# Patient Record
Sex: FEMALE | Race: WHITE | NOT HISPANIC OR LATINO
[De-identification: names, ages, dates, MRNs, and addresses within clinical notes are randomized per-mention and may not be internally consistent; named-entity substitution may affect disease eponyms.]

---

## 2019-02-26 ENCOUNTER — RECORD ABSTRACTING (OUTPATIENT)
Age: 49
End: 2019-02-26

## 2019-02-26 DIAGNOSIS — Z86.39 PERSONAL HISTORY OF OTHER ENDOCRINE, NUTRITIONAL AND METABOLIC DISEASE: ICD-10-CM

## 2019-02-26 DIAGNOSIS — J06.9 ACUTE UPPER RESPIRATORY INFECTION, UNSPECIFIED: ICD-10-CM

## 2019-02-26 DIAGNOSIS — H91.92 UNSPECIFIED HEARING LOSS, LEFT EAR: ICD-10-CM

## 2019-02-26 DIAGNOSIS — Z91.09 OTHER ALLERGY STATUS, OTHER THAN TO DRUGS AND BIOLOGICAL SUBSTANCES: ICD-10-CM

## 2019-02-26 DIAGNOSIS — Z83.3 FAMILY HISTORY OF DIABETES MELLITUS: ICD-10-CM

## 2019-02-26 DIAGNOSIS — Z87.19 PERSONAL HISTORY OF OTHER DISEASES OF THE DIGESTIVE SYSTEM: ICD-10-CM

## 2019-02-26 DIAGNOSIS — Z86.59 PERSONAL HISTORY OF OTHER MENTAL AND BEHAVIORAL DISORDERS: ICD-10-CM

## 2019-02-26 DIAGNOSIS — Z80.9 FAMILY HISTORY OF MALIGNANT NEOPLASM, UNSPECIFIED: ICD-10-CM

## 2019-02-26 DIAGNOSIS — Z82.49 FAMILY HISTORY OF ISCHEMIC HEART DISEASE AND OTHER DISEASES OF THE CIRCULATORY SYSTEM: ICD-10-CM

## 2019-02-26 PROBLEM — Z00.00 ENCOUNTER FOR PREVENTIVE HEALTH EXAMINATION: Status: ACTIVE | Noted: 2019-02-26

## 2019-02-26 LAB — CYTOLOGY CVX/VAG DOC THIN PREP: NORMAL

## 2019-02-26 RX ORDER — CABERGOLINE 0.5 MG/1
TABLET ORAL
Refills: 0 | Status: ACTIVE | COMMUNITY

## 2019-02-26 RX ORDER — BUPROPION HYDROCHLORIDE 300 MG/1
300 TABLET, EXTENDED RELEASE ORAL
Refills: 0 | Status: ACTIVE | COMMUNITY

## 2019-02-26 RX ORDER — PRAMIPEXOLE DIHYDROCHLORIDE 0.75 MG/1
TABLET ORAL
Refills: 0 | Status: ACTIVE | COMMUNITY

## 2019-02-26 RX ORDER — LIOTHYRONINE SODIUM 5 UG/1
TABLET ORAL
Refills: 0 | Status: ACTIVE | COMMUNITY

## 2019-02-26 RX ORDER — CHOLECALCIFEROL (VITAMIN D3)
CRYSTALS MISCELLANEOUS
Refills: 0 | Status: ACTIVE | COMMUNITY

## 2019-02-26 RX ORDER — LEVOMEFOLATE CALCIUM 15 MG
15 TABLET ORAL
Refills: 0 | Status: ACTIVE | COMMUNITY

## 2019-02-26 RX ORDER — FEXOFENADINE HYDROCHLORIDE 180 MG/1
180 TABLET ORAL DAILY
Refills: 0 | Status: ACTIVE | COMMUNITY

## 2019-02-26 RX ORDER — RANITIDINE HYDROCHLORIDE 300 MG/1
300 CAPSULE ORAL
Refills: 0 | Status: ACTIVE | COMMUNITY

## 2019-02-26 RX ORDER — ARMODAFINIL 150 MG/1
150 TABLET ORAL
Refills: 0 | Status: ACTIVE | COMMUNITY

## 2019-02-26 RX ORDER — MULTIVIT-MIN/FOLIC/VIT K/LYCOP 400-300MCG
50 MCG TABLET ORAL
Refills: 0 | Status: ACTIVE | COMMUNITY

## 2019-02-26 RX ORDER — ROSUVASTATIN CALCIUM 10 MG/1
10 TABLET, FILM COATED ORAL
Refills: 0 | Status: ACTIVE | COMMUNITY

## 2019-02-26 RX ORDER — METFORMIN HYDROCHLORIDE 500 MG/1
500 TABLET, EXTENDED RELEASE ORAL
Refills: 0 | Status: ACTIVE | COMMUNITY

## 2019-02-26 RX ORDER — FOLIC ACID/VIT B COMPLEX AND C 400 MCG
TABLET ORAL DAILY
Refills: 0 | Status: ACTIVE | COMMUNITY

## 2019-02-26 RX ORDER — ESCITALOPRAM OXALATE 20 MG/1
20 TABLET ORAL DAILY
Refills: 0 | Status: ACTIVE | COMMUNITY

## 2019-03-04 ENCOUNTER — APPOINTMENT (OUTPATIENT)
Dept: OTOLARYNGOLOGY | Facility: CLINIC | Age: 49
End: 2019-03-04
Payer: COMMERCIAL

## 2019-03-04 VITALS
WEIGHT: 153 LBS | SYSTOLIC BLOOD PRESSURE: 100 MMHG | DIASTOLIC BLOOD PRESSURE: 63 MMHG | HEART RATE: 98 BPM | BODY MASS INDEX: 25.49 KG/M2 | HEIGHT: 65 IN

## 2019-03-04 PROCEDURE — 99213 OFFICE O/P EST LOW 20 MIN: CPT

## 2019-03-04 RX ORDER — METHYLPREDNISOLONE 4 MG/1
4 TABLET ORAL
Refills: 0 | Status: DISCONTINUED | COMMUNITY
End: 2019-03-04

## 2019-03-04 NOTE — HISTORY OF PRESENT ILLNESS
[de-identified] : ALIYAH CLINTON Was seen in followup on March 4. Her sinus culture had grown staph aureus and her throat was hemolytic strep, both sensitive.  She had no problems on the flight and basically is feeling better. She is not having any more purulence in her throat is improved but she still is quite congested.\par Additionally, she has a history of hearing losses and is doing well with her hearing aids.The patient had no other ear nose or throat complaints at this visit.

## 2019-03-04 NOTE — ASSESSMENT
[FreeTextEntry1] : It was my impression that she has a sensorineural hearing losses and I recommended following up to have her hearing aids evaluated as needed.\par She is much better but still has purulence in the left middle meatus. Her previous culture was staph and strep and I suggested another 10 day course of Augmentin. However, because of the recurrences in the persistence and her septal deflection, I suggested imaging of the paranasal sinuses post treatment and would like to see her back in followup

## 2019-03-04 NOTE — PHYSICAL EXAM
[FreeTextEntry1] : General:\par The patient was alert and oriented and in no distress.\par Voice was clear. She looked better but sounded congested.\par \par Oral cavity:\par The oral mucosa was normal.\par The oral and base of tongue were clear and without mass.\par The gingival and buccal mucosa were moist and without lesions.\par The palate moved well.\par There was no cleft to the palate.\par There appeared to be good salivary flow.  \par There was no pus, erythema or mass in the oral cavity.\par \par Neck: \par The neck was symmetrical.\par The parotid and submandibular glands were normal without masses.\par The trachea was midline and there was no unusual crepitus.\par The thyroid was smooth and nontender and no masses were palpated.\par There was no significant cervical adenopathy.\par \par Face:\par The patient had no facial asymmetry or mass.\par The skin was unremarkable.\par \par Ears:\par The external ears were normal without deformity.\par The ear canals were clear.\par The tympanic membranes were intact and normal.\par She uses binaural amplification.\par \par Nasal endoscopy: \par CPT 45488\par Procedure Note:\par \par Nasal endoscopy was done with topical anesthesia of Pontocaine and Afrin and a      nasal endoscope.\par Indication: Nasal congestion, rule out sinusitis. Evaluation of response to therapy\par Procedure: The nasal cavity was anesthetized with topical Afrin and Pontocaine. An  endoscope was used and inserted into the nasal cavity.\par Attention was first paid to the anterior nasal cavity.\par This showed that the nasal mucosa was much improved. However, there still was scarring and a significant left septal deflection and purulent discharge in the left middle meatus. I did not reculture.\par \par \par \par

## 2019-03-22 ENCOUNTER — APPOINTMENT (OUTPATIENT)
Dept: OTOLARYNGOLOGY | Facility: CLINIC | Age: 49
End: 2019-03-22
Payer: COMMERCIAL

## 2019-03-22 PROCEDURE — V5299A: CUSTOM | Mod: NC

## 2019-03-31 ENCOUNTER — TRANSCRIPTION ENCOUNTER (OUTPATIENT)
Age: 49
End: 2019-03-31

## 2019-04-03 ENCOUNTER — APPOINTMENT (OUTPATIENT)
Dept: OTOLARYNGOLOGY | Facility: CLINIC | Age: 49
End: 2019-04-03

## 2019-04-18 ENCOUNTER — APPOINTMENT (OUTPATIENT)
Dept: OTOLARYNGOLOGY | Facility: CLINIC | Age: 49
End: 2019-04-18
Payer: COMMERCIAL

## 2019-04-18 PROCEDURE — V5299A: CUSTOM | Mod: NC

## 2019-04-25 ENCOUNTER — APPOINTMENT (OUTPATIENT)
Dept: OTOLARYNGOLOGY | Facility: CLINIC | Age: 49
End: 2019-04-25
Payer: COMMERCIAL

## 2019-04-25 VITALS
WEIGHT: 153 LBS | SYSTOLIC BLOOD PRESSURE: 117 MMHG | BODY MASS INDEX: 25.49 KG/M2 | HEIGHT: 65 IN | HEART RATE: 89 BPM | DIASTOLIC BLOOD PRESSURE: 75 MMHG

## 2019-04-25 DIAGNOSIS — Z97.4 PRESENCE OF EXTERNAL HEARING-AID: ICD-10-CM

## 2019-04-25 DIAGNOSIS — R09.81 NASAL CONGESTION: ICD-10-CM

## 2019-04-25 DIAGNOSIS — J34.2 DEVIATED NASAL SEPTUM: ICD-10-CM

## 2019-04-25 PROCEDURE — 99213 OFFICE O/P EST LOW 20 MIN: CPT | Mod: 25

## 2019-04-25 PROCEDURE — 31231 NASAL ENDOSCOPY DX: CPT

## 2019-04-25 RX ORDER — AMOXICILLIN AND CLAVULANATE POTASSIUM 875; 125 MG/1; MG/1
875-125 TABLET, COATED ORAL
Qty: 20 | Refills: 1 | Status: DISCONTINUED | COMMUNITY
Start: 2019-03-04 | End: 2019-04-25

## 2019-04-25 NOTE — ASSESSMENT
[FreeTextEntry1] : I reviewed the findings with her. 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 She has scattered frontal ethmoidal disease.\par \par As she has failed maximal medical therapy,\par I recommended revision septoplasty and inferior to the reduction this with bilateral maxillary and ethmoid surgery with opening of the accessory and frontal sinusotomies.\par \par Risks and benefits were discussed.

## 2019-04-25 NOTE — PHYSICAL EXAM
[FreeTextEntry1] : General:\par The patient was alert and oriented and in no distress.\par Voice was clear. She was somewhat congested but improved\par \par Ears:\par The external ears were normal without deformity.\par The ear canals were clear.\par The tympanic membranes were intact and normal.\par She uses hearing aids binaurally\par \par Oral cavity:\par The oral mucosa was normal.\par The oral and base of tongue were clear and without mass.\par The gingival and buccal mucosa were moist and without lesions.\par The palate moved well.\par There was no cleft to the palate.\par There appeared to be good salivary flow.  \par There was no pus, erythema or mass in the oral cavity.\par \par Neck: \par The neck was symmetrical.\par The parotid and submandibular glands were normal without masses.\par The trachea was midline and there was no unusual crepitus.\par The thyroid was smooth and nontender and no masses were palpated.\par There was no significant cervical adenopathy.\par \par Nasal endoscopy: \par CPT 24981\par Procedure Note:\par \par Nasal endoscopy was done with topical anesthesia of Pontocaine and Afrin and a      nasal endoscope.\par Indication: Nasal congestion, rule out sinusitis.\par Procedure: The nasal cavity was anesthetized with topical Afrin and Pontocaine. An  endoscope was used and inserted into the nasal cavity.\par Attention was first paid to the anterior nasal cavity.\par This was done with her CT for guidance. This shows that the nasal mucosa is quite boggy. There was a persistent the right septal deflection. There was stenosis and scarring of the left nasal airway along the anterior tip of the middle turbinate and extending to the lateral wall and obstructing the osteomeatal unit. There was a small amount of thickened discharge.\par The right middle meatus was also obstructed by scarring.\par \par A review the imaging with her.- done after maximal improvement from prolonged medical care\par Imaging shows inferior turbinate hypertrophy and scarring of the turbinates to the septum with residual right septal deflection.\par It shows bilateral missed ostium sequences with obstruction to the natural ostium from scarring and moderate ethmoidal disease and thickening in the frontal recesses.\par \par

## 2019-04-25 NOTE — HISTORY OF PRESENT ILLNESS
[de-identified] : ALIYAH CLINTON Was seen in followup on April 24th.  She had finished another course of antibiotics and was improved- but not feeling completely better.  She had CT scans post maximal therapy . Her sinus culture had grown staph aureus and her throat was hemolytic strep, both sensitive.  She still has congestion and right forehead pain with scattered purulent discharge.  She has not had recent dental problems. \par Additionally, she has a history of hearing losses and is doing well with her hearing aids.The patient had no other ear nose or throat complaints at this visit.

## 2019-04-25 NOTE — CONSULT LETTER
[Dear  ___] : Dear  [unfilled], [Please see my note below.] : Please see my note below. [Consult Letter:] : I had the pleasure of evaluating your patient, [unfilled]. [Consult Closing:] : Thank you very much for allowing me to participate in the care of this patient.  If you have any questions, please do not hesitate to contact me. [Sincerely,] : Sincerely, [FreeTextEntry2] : ATHANASIO MALLIOS\par  [FreeTextEntry3] : Petr Tierney\par \par Slim Tierney MD\par [facility]\par [facility]\par  [facility]\par \par

## 2019-06-26 RX ORDER — OMEPRAZOLE 40 MG/1
40 CAPSULE, DELAYED RELEASE ORAL
Qty: 30 | Refills: 5 | Status: ACTIVE | COMMUNITY
Start: 2019-06-26 | End: 1900-01-01

## 2019-06-26 RX ORDER — PREDNISONE 20 MG/1
20 TABLET ORAL DAILY
Qty: 8 | Refills: 0 | Status: ACTIVE | COMMUNITY
Start: 2019-06-26 | End: 1900-01-01

## 2019-06-27 ENCOUNTER — RESULT REVIEW (OUTPATIENT)
Age: 49
End: 2019-06-27

## 2019-06-27 ENCOUNTER — OUTPATIENT (OUTPATIENT)
Dept: OUTPATIENT SERVICES | Facility: HOSPITAL | Age: 49
LOS: 1 days | Discharge: HOME | End: 2019-06-27
Payer: COMMERCIAL

## 2019-06-27 ENCOUNTER — APPOINTMENT (OUTPATIENT)
Dept: OTOLARYNGOLOGY | Facility: AMBULATORY SURGERY CENTER | Age: 49
End: 2019-06-27

## 2019-06-27 PROCEDURE — 31253 NSL/SINS NDSC TOTAL: CPT | Mod: 50

## 2019-06-27 PROCEDURE — 30140 RESECT INFERIOR TURBINATE: CPT | Mod: LT

## 2019-06-27 PROCEDURE — 61782 SCAN PROC CRANIAL EXTRA: CPT

## 2019-06-27 PROCEDURE — 30520 REPAIR OF NASAL SEPTUM: CPT

## 2019-06-27 PROCEDURE — 31267 ENDOSCOPY MAXILLARY SINUS: CPT | Mod: 50

## 2019-07-02 ENCOUNTER — APPOINTMENT (OUTPATIENT)
Dept: OTOLARYNGOLOGY | Facility: CLINIC | Age: 49
End: 2019-07-02
Payer: COMMERCIAL

## 2019-07-02 VITALS
HEART RATE: 95 BPM | DIASTOLIC BLOOD PRESSURE: 62 MMHG | WEIGHT: 153 LBS | SYSTOLIC BLOOD PRESSURE: 97 MMHG | HEIGHT: 65 IN | BODY MASS INDEX: 25.49 KG/M2

## 2019-07-02 PROCEDURE — 31237 NSL/SINS NDSC SURG BX POLYPC: CPT | Mod: 50

## 2019-07-02 NOTE — CONSULT LETTER
[Dear  ___] : Dear  [unfilled], [Courtesy Letter:] : I had the pleasure of seeing your patient, [unfilled], in my office today. [Please see my note below.] : Please see my note below. [Consult Closing:] : Thank you very much for allowing me to participate in the care of this patient.  If you have any questions, please do not hesitate to contact me. [Sincerely,] : Sincerely, [FreeTextEntry2] : ATHANASIO MALLIOS\par  [FreeTextEntry3] : Petr Tierney\par \par Slim Tierney MD\par [facility]\par [facility] [place]\par  [facility]\par \par

## 2019-07-02 NOTE — HISTORY OF PRESENT ILLNESS
[de-identified] : ALIYAH CLINTON Was seen in followup on July 2. She is 5 days postoperatively from revision septoplasty and revision endoscopic sinus surgery. At the time of surgery she had multiple adhesions and scarring. Her surgery was uneventful and she comes in for first postoperative cleaning

## 2019-07-02 NOTE — PHYSICAL EXAM
[FreeTextEntry1] : \par The patient returns for postoperative sinus debridement CPT 27302-16.\par \par Procedure note:\par \par The nasal cavity was anesthetized topically and locally.  Both rigid 0 and 30° endoscopes were used for debridement.  The septal splint was left in place because of the findings of the significant adhesions at her surgery.  There was no evidence of hematoma.  Using straight and curved suctions and straight and 30° up-biting forceps, the nasal cavity and sinuses were debrided anteriorly.  The patient appeared to be having an excellent result.  Propel was left in place.  There were no complications.

## 2019-07-02 NOTE — ASSESSMENT
[FreeTextEntry1] :  I placed the patient on budesonide rinses and will see the patient back in 7-10 days for a repeat middle meatal debridement.\par

## 2019-07-03 LAB — SURGICAL PATHOLOGY STUDY: SIGNIFICANT CHANGE UP

## 2019-07-09 ENCOUNTER — APPOINTMENT (OUTPATIENT)
Dept: OTOLARYNGOLOGY | Facility: CLINIC | Age: 49
End: 2019-07-09
Payer: COMMERCIAL

## 2019-07-09 PROCEDURE — 99024 POSTOP FOLLOW-UP VISIT: CPT

## 2019-07-09 PROCEDURE — 31237 NSL/SINS NDSC SURG BX POLYPC: CPT | Mod: 50,58

## 2019-07-09 NOTE — PHYSICAL EXAM
[FreeTextEntry1] : The patient is status post endoscopic sinus surgery and returns for middle meatal debridement.\par \par Procedure note:  80741-04\par \par The nasal cavity was anesthetized topically and locally.  Using a rigid 0° and a rigid 30° endoscope, using both straight and curved suction iand a straight and the 30° up-biting forceps, both middle meati were suctioned and debrided to clear.  The ethmoids, antra and frontal recesses appeared to be having an excellent result.  The septum was midline and without hematoma.  The inferior turbinates were debrided again.  The patient was told to continue on nasal steroids and hypertonic saline rinses.  I recommended a repeat evaluation and cleaning in 2 weeks.\par \par \par There was no evidence of recurrence of her scarring

## 2019-07-09 NOTE — HISTORY OF PRESENT ILLNESS
[de-identified] : ALIYAH CLINTON Was seen in followup on July 9. She is 12 days postoperatively from revision septoplasty and revision endoscopic sinus surgery. At the time of surgery she had multiple adhesions and scarring.  She has been doing well. She notes some nasal congestion and comes in for second postoperative debridement.

## 2019-07-09 NOTE — CONSULT LETTER
[FreeTextEntry2] : ATHANASIO MALLIOS\par  [FreeTextEntry1] : \par \par Dear ASA DIGGS,\par \par I had the pleasure of seeing your patient today.  \par Please see my note below.\par \par \par Thank you very much for allowing me to participate in the care of your patient.\par \par Sincerely,\par \par \par Petr Tierney\par \par Slim Tierney MD\par [facility]\par [facility]\par  [facility]\par \par

## 2019-08-13 ENCOUNTER — APPOINTMENT (OUTPATIENT)
Dept: OTOLARYNGOLOGY | Facility: CLINIC | Age: 49
End: 2019-08-13
Payer: COMMERCIAL

## 2019-08-13 PROCEDURE — 99213 OFFICE O/P EST LOW 20 MIN: CPT | Mod: 25

## 2019-08-13 PROCEDURE — 31231 NASAL ENDOSCOPY DX: CPT | Mod: 58

## 2019-08-13 RX ORDER — MUPIROCIN 20 MG/G
2 OINTMENT TOPICAL TWICE DAILY
Qty: 1 | Refills: 5 | Status: ACTIVE | COMMUNITY
Start: 2019-08-13 | End: 1900-01-01

## 2019-08-13 NOTE — CONSULT LETTER
[FreeTextEntry2] : ATHANASIO MALLIOS\par  [FreeTextEntry1] : \par \par Dear  Dr. ASA DIGGS,\par \par I had the pleasure of seeing your patient today.  \par Please see my note below.\par \par \par Thank you very much for allowing me to participate in the care of your patient.\par \par Sincerely,\par \par \par \par Slim Tierney MD\par [facility]\par [facility]\par  [facility]\par \par

## 2019-08-13 NOTE — PHYSICAL EXAM
[FreeTextEntry1] : \par The patient was alert and oriented and in no distress.\par Voice was clear.\par \par Face:\par The patient had no facial asymmetry or mass.\par The skin was unremarkable.\par \par Eyes:\par The pupils were equal round and reactive to light and accommodation.\par There was no significant nystagmus or disconjugate gaze noted.\par \par Nose: \par The external nose had no significant deformity.  There was no facial tenderness. \par \par Nasal endoscopy: \par CPT 69620\par Procedure Note:\par \par Nasal endoscopy was done with topical anesthesia of Pontocaine and Afrin and a      nasal endoscope.\par Indication: Nasal congestion, rule out sinusitis.\par Procedure: The nasal cavity was anesthetized with topical Afrin and Pontocaine. An  endoscope was used and inserted into the nasal cavity.\par Attention was first paid to the anterior nasal cavity.\par Endocoscopy was performed to inspect the interior of the nasal cavity, the nasal septum,  the middle and superior meati, the inferior, middle and superior turbinates, and the spheno-ethmoidal  recesses, the nasopharynx and eustachian tube orifices bilaterally. \par All findings were normal except:\par \par This showed that the septum was midline. There was a small eschar on the right mid septum. There were no scar bands at this point. The mucosa was dry and crusty, throughout.\par \par \par \par Oral cavity:\par The oral mucosa was normal.\par The oral and base of tongue were clear and without mass.\par The gingival and buccal mucosa were moist and without lesions.\par The palate moved well.\par There was no cleft to the palate.\par There appeared to be good salivary flow.  \par There was no pus, erythema or mass in the oral cavity.\par \par \par Ears:\par The external ears were normal without deformity.\par The ear canals were clear.\par The tympanic membranes were intact and normal.\par \par Neck: \par The neck was symmetrical.\par The parotid and submandibular glands were normal without masses.\par The trachea was midline and there was no unusual crepitus.\par The thyroid was smooth and nontender and no masses were palpated.\par There was no significant cervical adenopathy.\par \par \par Neuro:\par Neurologically, the patient was awake, alert, and oriented to person, place and time. There were no obvious focal neurologic abnormalities.  Cranial nerves II through XII were grossly intact.\par \par \par TMJ:\par The temporomandibular joints were nontender.\par There was no abnormal crepitus and no significant malocclusion\par

## 2019-08-13 NOTE — HISTORY OF PRESENT ILLNESS
[de-identified] : ALIYAH CLINTON Was seen in follow up on August 13. She is 6 weeks status post revision endoscopic sinus surgery for chronic sinusitis with multiple scars.\par She notes that she had no problems on her recent flight. Her airway is significantly improved, however, she still gets crusting and has had some nosebleeds.The patient had no other ear nose or throat complaints at this visit.

## 2019-08-13 NOTE — REVIEW OF SYSTEMS
[Sneezing] : sneezing [Seasonal Allergies] : seasonal allergies [Hearing Loss] : hearing loss [Nasal Congestion] : nasal congestion [Nose Bleeds] : nose bleeds [Recurrent Sinus Infections] : recurrent sinus infections [Sinus Pressure] : sinus pressure [Sense Of Smell Problem] : sense of smell problem [Discolored Nasal Discharge] : discolored nasal discharge [Dry Eyes] : dry eyes [Eyes Itch] : itching of the eyes [SOB on Exertion] : shortness of breath during exertion [Heartburn] : heartburn [Sleep Disturbances] : sleep disturbances [Anxiety] : anxiety [Depression] : depression [Hot Flashes] : hot flashes [Negative] : Heme/Lymph

## 2019-08-13 NOTE — ASSESSMENT
[FreeTextEntry1] : It was my impression that she was doing well 6 weeks postoperatively with a significant improvement in her airway and her sinuses all were patent. She had dry crusting of her mucosa throughout. She had stopped using the budesonide rinses and I recommended going back to at least once a day. I suggested Bactroban ointment topically twice a day and would like to see her back in followup in 3-4 weeks to make sure that this responds and that the scarring does not recur.

## 2019-09-10 ENCOUNTER — APPOINTMENT (OUTPATIENT)
Dept: OTOLARYNGOLOGY | Facility: CLINIC | Age: 49
End: 2019-09-10

## 2019-09-22 NOTE — REASON FOR VISIT
[FreeTextEntry2] : Post op visit. Breathing is better, taste is back, but still a lot of crusting inside the nose. ADMIT

## 2019-09-24 ENCOUNTER — APPOINTMENT (OUTPATIENT)
Dept: OTOLARYNGOLOGY | Facility: CLINIC | Age: 49
End: 2019-09-24
Payer: COMMERCIAL

## 2019-09-24 DIAGNOSIS — K21.9 GASTRO-ESOPHAGEAL REFLUX DISEASE W/OUT ESOPHAGITIS: ICD-10-CM

## 2019-09-24 DIAGNOSIS — J34.89 OTHER SPECIFIED DISORDERS OF NOSE AND NASAL SINUSES: ICD-10-CM

## 2019-09-24 PROCEDURE — 31231 NASAL ENDOSCOPY DX: CPT | Mod: 58

## 2019-09-24 PROCEDURE — 99213 OFFICE O/P EST LOW 20 MIN: CPT | Mod: 25

## 2019-09-24 RX ORDER — CEFUROXIME AXETIL 250 MG/1
250 TABLET ORAL
Qty: 28 | Refills: 0 | Status: DISCONTINUED | COMMUNITY
Start: 2019-06-26 | End: 2019-09-24

## 2019-09-24 RX ORDER — ACETAMINOPHEN AND CODEINE 300; 30 MG/1; MG/1
300-30 TABLET ORAL
Qty: 12 | Refills: 0 | Status: DISCONTINUED | COMMUNITY
Start: 2019-06-26 | End: 2019-09-24

## 2019-09-24 NOTE — ASSESSMENT
[FreeTextEntry1] : It was my impression that she was doing well  3 months postoperatively with a significant improvement in her airway and her sinuses all were patent. The dry crusting was much better.  Sinuses all looked excellent without recurrence of the scarring and the septum was midline.\par At this point, I suggested continuing with the budesonide rinses as needed and continuing with her allergy care.\par Her reflux is much better with omeprazole and we discussed switching to the Pepcid at bedtime over-the-counter 20 mg. If she breaks through, she should probably go back to omeprazole and this was reviewed.\par She is following her for her hearing aids and I suggested repeat evaluation in 3 months or as needed

## 2019-09-24 NOTE — REVIEW OF SYSTEMS
[Seasonal Allergies] : seasonal allergies [Sneezing] : sneezing [Hearing Loss] : hearing loss [Nasal Congestion] : nasal congestion [Nose Bleeds] : nose bleeds [Recurrent Sinus Infections] : recurrent sinus infections [Problem Snoring] : problem snoring [Sinus Pressure] : sinus pressure [Throat Itching] : throat itching [Feeling Tired] : feeling tired [Recent Weight Gain (___ Lbs)] : recent [unfilled] ~Ulb weight gain [Sense Of Smell Problem] : sense of smell problem [Dry Eyes] : dry eyes [Discolored Nasal Discharge] : discolored nasal discharge [Eyes Itch] : itching of the eyes [SOB on Exertion] : shortness of breath during exertion [Sleep Disturbances] : sleep disturbances [Anxiety] : anxiety [Heartburn] : heartburn [Hot Flashes] : hot flashes [Depression] : depression [Negative] : Heme/Lymph

## 2019-09-24 NOTE — PHYSICAL EXAM
[FreeTextEntry1] : \par The patient was alert and oriented and in no distress.\par Voice was clear.\par \par Face:\par The patient had no facial asymmetry or mass.\par The skin was unremarkable.\par \par Eyes:\par The pupils were equal round and reactive to light and accommodation.\par There was no significant nystagmus or disconjugate gaze noted.\par \par Nose: \par The external nose had no significant deformity.  There was no facial tenderness.  On anterior rhinoscopy, the nasal mucosa was clear.  \par \par \par Oral cavity:\par The oral mucosa was normal.\par The oral and base of tongue were clear and without mass.\par The gingival and buccal mucosa were moist and without lesions.\par The palate moved well.\par There was no cleft to the palate.\par There appeared to be good salivary flow.  \par There was no pus, erythema or mass in the oral cavity.\par \par \par Ears:  She uses hearing aids in both ears\par The external ears were normal without deformity.\par The ear canals were clear.\par The tympanic membranes were intact and normal.\par \par Neck: \par The neck was symmetrical.\par The parotid and submandibular glands were normal without masses.\par The trachea was midline and there was no unusual crepitus.\par The thyroid was smooth and nontender and no masses were palpated.\par There was no significant cervical adenopathy.\par \par \par Neuro:\par Neurologically, the patient was awake, alert, and oriented to person, place and time. There were no obvious focal neurologic abnormalities.  Cranial nerves II through XII were grossly intact.\par \par \par TMJ:\par The temporomandibular joints were nontender.\par There was no abnormal crepitus and no significant malocclusion\par  [de-identified] : Nasal endoscopy:\par \par Nasal endoscopy was done with topical anesthesia and a flexible endoscope to evaluate for nasal polyps, chronic sinusitis and response to therapy.\par Endocoscopy was performed to inspect the interior of the nasal cavity, the nasal septum,  the middle and superior meati, the inferior, middle and superior turbinates, and the spheno-ethmoidal  recesses, the nasopharynx and eustachian tube orifices bilaterally\par Evaluation showed that the septum was midline.  There was no significant mucosal disease.  The inferior turbinates and middle turbinates were normal.  On both sides, the surgically opened ethmoids, antra, and frontal recesses were clear.  There was no evidence of polypoid recurrences and no purulence.  The mucosa was close to stage zero.  The nasopharynx was benign.    The middle and  superior meati were normal and the sphenoethmoidal recesses were without evidence of disease

## 2019-09-24 NOTE — CONSULT LETTER
[FreeTextEntry2] : ATHANASIO MALLIOS\par  [FreeTextEntry1] : \par \par Dear  Dr. ASA DIGGS,\par \par I had the pleasure of seeing your patient today.  \par Please see my note below.\par \par \par Thank you very much for allowing me to participate in the care of your patient.\par \par Sincerely,\par \par \par \par Slim Tierney MD\par [facility]\par [facility]\par  [facility]\par \par

## 2019-09-24 NOTE — HISTORY OF PRESENT ILLNESS
[de-identified] : ALIYAH CLINTON Was seen in follow up on September 24th. She is 3 months status post revision endoscopic sinus surgery for chronic sinusitis with multiple scars on June 27th. \par She notes that she is doing well Her airway is significantly improved, however, she still gets crusting, which is better if she uses the wash or ointment.\par She has the hearing losses and uses her aids satisfactorily.\par Her reflux symptoms resolve on Omeprazole. \par The patient had no other ear nose or throat complaints at this visit.

## 2019-11-19 ENCOUNTER — APPOINTMENT (OUTPATIENT)
Dept: OTOLARYNGOLOGY | Facility: CLINIC | Age: 49
End: 2019-11-19
Payer: COMMERCIAL

## 2019-11-19 VITALS
BODY MASS INDEX: 25.49 KG/M2 | SYSTOLIC BLOOD PRESSURE: 101 MMHG | WEIGHT: 153 LBS | HEART RATE: 90 BPM | DIASTOLIC BLOOD PRESSURE: 57 MMHG | HEIGHT: 65 IN

## 2019-11-19 DIAGNOSIS — H90.3 SENSORINEURAL HEARING LOSS, BILATERAL: ICD-10-CM

## 2019-11-19 DIAGNOSIS — R06.83 SNORING: ICD-10-CM

## 2019-11-19 DIAGNOSIS — J32.9 CHRONIC SINUSITIS, UNSPECIFIED: ICD-10-CM

## 2019-11-19 DIAGNOSIS — J34.89 OTHER SPECIFIED DISORDERS OF NOSE AND NASAL SINUSES: ICD-10-CM

## 2019-11-19 DIAGNOSIS — G47.9 SLEEP DISORDER, UNSPECIFIED: ICD-10-CM

## 2019-11-19 DIAGNOSIS — J30.1 ALLERGIC RHINITIS DUE TO POLLEN: ICD-10-CM

## 2019-11-19 PROCEDURE — 92557 COMPREHENSIVE HEARING TEST: CPT

## 2019-11-19 PROCEDURE — 31231 NASAL ENDOSCOPY DX: CPT

## 2019-11-19 PROCEDURE — 92567 TYMPANOMETRY: CPT

## 2019-11-19 PROCEDURE — 99214 OFFICE O/P EST MOD 30 MIN: CPT | Mod: 25

## 2019-11-19 NOTE — ASSESSMENT
[FreeTextEntry1] : It was my impression that she was doing well  5 months postoperatively with a significant improvement in her airway and her sinuses all were patent. The dry crusting was much better.  Her sinuses all looked excellent without recurrence of the scarring and the septum was midline.\par At this point, I suggested continuing with the budesonide rinses as needed and continuing with her allergy care.\par Her reflux is mostly controlled without medication other than TUMS.  I felt that was fine, but she should probably have her gastroenterologist look to make sure there are no esophageal indications for her to continue on medication.  \par She is doing well with an oral appliance for her sleep obstruction.  I would suggest repeating her study with the appliance when Dr. Hale feels it is appropriate. \par She has the sensorineural hearing losses that are stable and she is doing well with her aids.  She will have them adjusted next month and follow up as needed.\par She is following her for her hearing aids and I suggested repeat evaluation in 6 months or as needed

## 2019-11-19 NOTE — REVIEW OF SYSTEMS
[Sneezing] : sneezing [Seasonal Allergies] : seasonal allergies [Hearing Loss] : hearing loss [Recurrent Sinus Infections] : recurrent sinus infections [Nasal Congestion] : nasal congestion [Nose Bleeds] : nose bleeds [Sinus Pressure] : sinus pressure [Problem Snoring] : problem snoring [Discolored Nasal Discharge] : discolored nasal discharge [Sense Of Smell Problem] : sense of smell problem [Feeling Tired] : feeling tired [Throat Itching] : throat itching [Recent Weight Gain (___ Lbs)] : recent [unfilled] ~Ulb weight gain [Dry Eyes] : dry eyes [Eyes Itch] : itching of the eyes [Sleep Disturbances] : sleep disturbances [SOB on Exertion] : shortness of breath during exertion [Heartburn] : heartburn [Anxiety] : anxiety [Depression] : depression [Hot Flashes] : hot flashes [Negative] : Heme/Lymph

## 2019-11-19 NOTE — PHYSICAL EXAM
[FreeTextEntry1] : \par The patient was alert and oriented and in no distress.\par Voice was clear.\par \par Face:\par The patient had no facial asymmetry or mass.\par The skin was unremarkable.\par \par Eyes:\par The pupils were equal round and reactive to light and accommodation.\par There was no significant nystagmus or disconjugate gaze noted.\par \par Nose: \par The external nose had no significant deformity.  There was no facial tenderness.  On anterior rhinoscopy, the nasal mucosa was clear.  \par \par \par Oral cavity:\par The oral mucosa was normal.\par The oral and base of tongue were clear and without mass.\par The gingival and buccal mucosa were moist and without lesions.\par The palate moved well.\par There was no cleft to the palate.\par There appeared to be good salivary flow.  \par There was no pus, erythema or mass in the oral cavity.\par \par \par Ears:  She uses hearing aids in both ears\par The external ears were normal without deformity.\par The ear canals were clear.\par The tympanic membranes were intact and normal.\par \par Neck: \par The neck was symmetrical.\par The parotid and submandibular glands were normal without masses.\par The trachea was midline and there was no unusual crepitus.\par The thyroid was smooth and nontender and no masses were palpated.\par There was no significant cervical adenopathy.\par \par \par Neuro:\par Neurologically, the patient was awake, alert, and oriented to person, place and time. There were no obvious focal neurologic abnormalities.  Cranial nerves II through XII were grossly intact.\par \par \par TMJ:\par The temporomandibular joints were nontender.\par There was no abnormal crepitus and no significant malocclusion\par  [de-identified] : Nasal endoscopy:\par \par Nasal endoscopy was done with topical anesthesia and a flexible endoscope to evaluate for nasal polyps, chronic sinusitis and response to therapy.\par Endocoscopy was performed to inspect the interior of the nasal cavity, the nasal septum,  the middle and superior meati, the inferior, middle and superior turbinates, and the spheno-ethmoidal  recesses, the nasopharynx and eustachian tube orifices bilaterally\par Evaluation showed that the septum was midline.  There was no significant mucosal disease.  The inferior turbinates and middle turbinates were normal.  On both sides, the surgically opened ethmoids, antra, and frontal recesses were clear.  There was no evidence of polypoid recurrences and no purulence.  The mucosa was close to stage zero.  The nasopharynx was benign.    The middle and  superior meati were normal and the sphenoethmoidal recesses were without evidence of disease  A. complete audiometric evaluation was done and showed the stable sensorineural hearing losses.  This was reviewed with the patient

## 2019-11-19 NOTE — HISTORY OF PRESENT ILLNESS
[de-identified] : ALIYAH CLINTON Was seen in follow up on November 19th. She is 5 months status post revision endoscopic sinus surgery for chronic sinusitis with multiple scars on June 27th. \par She notes that she is doing well Her airway is significantly improved, however, she still gets crusting, which is better if she uses the wash or ointment.\par She has the hearing losses and uses her aids satisfactorily.\par Her reflux symptoms resolve on Omeprazole.  However, she has since stopped medication and has symptoms from time to time that respond to TUMS\par She got an oral appliance from Dr. Hale which she believes is helping-  she is not snoring any longer apparently.\par The patient had no other ear nose or throat complaints at this visit.\par The patient had no other ear nose or throat complaints at this visit.

## 2019-11-19 NOTE — CONSULT LETTER
[FreeTextEntry1] : \par \par Dear  Dr. ASA DIGGS,\par \par I had the pleasure of seeing your patient today.  \par Please see my note below.\par \par \par Thank you very much for allowing me to participate in the care of your patient.\par \par Sincerely,\par \par \par \par Slim Tierney MD\par [facility]\par [facility]\par  [facility]\par \par  [FreeTextEntry2] : ATHANASIO MALLIOS\par

## 2019-12-05 ENCOUNTER — APPOINTMENT (OUTPATIENT)
Dept: OTOLARYNGOLOGY | Facility: CLINIC | Age: 49
End: 2019-12-05
Payer: SELF-PAY

## 2019-12-05 PROCEDURE — 92593: CPT | Mod: NC

## 2020-01-14 ENCOUNTER — RX RENEWAL (OUTPATIENT)
Age: 50
End: 2020-01-14

## 2020-01-14 RX ORDER — BUDESONIDE 0.5 MG/2ML
0.5 INHALANT ORAL
Qty: 120 | Refills: 5 | Status: ACTIVE | COMMUNITY
Start: 2020-01-14 | End: 1900-01-01

## 2020-10-20 ENCOUNTER — NON-APPOINTMENT (OUTPATIENT)
Age: 50
End: 2020-10-20

## 2020-12-21 PROBLEM — J06.9 ACUTE URI: Status: RESOLVED | Noted: 2019-02-26 | Resolved: 2020-12-21

## 2021-03-25 ENCOUNTER — APPOINTMENT (OUTPATIENT)
Dept: OTOLARYNGOLOGY | Facility: CLINIC | Age: 51
End: 2021-03-25
Payer: SELF-PAY

## 2021-03-25 PROCEDURE — 92593: CPT | Mod: NC

## 2021-12-13 ENCOUNTER — NON-APPOINTMENT (OUTPATIENT)
Age: 51
End: 2021-12-13